# Patient Record
(demographics unavailable — no encounter records)

---

## 2024-10-25 NOTE — HISTORY OF PRESENT ILLNESS
[FreeTextEntry1] : October 24, 2024 Patient returns for a follow up visit of osteoporosis Currently with frozen shoulder of the left shoulder following with orthopedist Patient has lost weight since last visit due to diet changes to manage kidney stones  Currently doing shoulder exercises at home  Reports visiting the ER re: kidney stone since last visit Patient is tracking dietary calcium intake  Currently doing pilates, dancing for exercise  No dental work planned or jaw pain noted Patient had first infusion in 12/2023, no side effects noted with first infusion, due for next reclast dose on 12/2024  Repeat DEXA in 2025 before third infusion  Denies upcoming dental work  No falls, continues weight bearing exercises  November 3, 2023 Patient returned for follow up via telephonic visit.  Discussed with patient.  You have chosen to receive care through the use of tele-media.  Telemedia enables health care providers at different locations to provide safe, effective, and convenient care through the use of technology.  Please note this is a billable encounter.  Patient understands that I cannot perform a physical exam and that patient may need to come to the clinic to complete the assessment.  Patient agreed verbally to understanding the risks and benefits of telemedia as explained.  Reviewed lab results Reviewed DEXA results Reviewed treatment options, risks and benefits Will proceed with iv zoledronic acid  October 9, 2023 63-year-old woman referred by orthopedics for rheumatology evaluation. Patient noted to have osteoporosis, by recent bone density.  Patient had shoulder surgery by orthopedist, concerned about quality of the bone, recommended to have bone density. This was patient's first bone density, no previous bone densities in the past. Diagnosed with osteoporosis in September 2023. Patient reports very healthy Medications: Valtrex Bupropion Previously taking estrogen/progesterone for OCP for many years, transition to HRT since menopause at age 52, tried to stop HRT but was very symptomatic, last time tried to d/c 9 months ago   Treated with Advair inhaler in 2005, less than one year, maybe three months +nephrolithiasis, nonobstructive kidney stones noted incidentally on 2020 on CT scan Ribs fracture x 2 episodes, using chest as "work surface" May have had tailbone fracture s/p fall and once falling in patient  No history of celiac disease Did not drink milk when child Did not consume dairy as thought had dairy issues  Does not take any vitamin d, last level in 2021 No PPI in past, maybe twice per year takes tums  No previous history of disordered eating Up until March, pilates 3 times per week Walks now after shoulder surgery  Left thumb osteoarthritis No tobacco No etoh [place] shorter than previously  Drinks a lot of diet coke, no ETOH, no smoking No joint pain No back pain Np personal history of cancer Mother with Multiple myeloma and pancreatic cancer father with metastatic melanoma  Still in PT for the shoulder, for ROM, now strengthening, once per week at this time DOS:6/27/23, Right shoulder arthroscopy with RCR, subpectoralis biceps tenodesis  GM broken hip at 99 years of age Mother fell off ladder at 85 and broke her wrist No jaw pain, no dental work planned

## 2024-10-25 NOTE — ADDENDUM
[FreeTextEntry1] :  I, Susanna Robins, acted solely as a scribe for Dr. Jamia Plummer, direction and personally dictated by me on 10/24/2024. I have reviewed the chart and agree that the record accurately reflects my personal performance of the history, physical exam, assessment, and plan. I have also personally directed, reviewed, and agreed with the chart.

## 2024-10-25 NOTE — DATA REVIEWED
[FreeTextEntry1] : September 29, 2023 Bone density lumbar spine T score -4.0  left femur T score -2.7 Left femoral neck -2.2  Labs June 28, 2023  CMP normal  2021 Vitamin D 69.8 B12 452

## 2024-10-25 NOTE — PHYSICAL EXAM
[General Appearance - Alert] : alert [General Appearance - In No Acute Distress] : in no acute distress [Oriented To Time, Place, And Person] : oriented to person, place, and time [Impaired Insight] : insight and judgment were intact [Affect] : the affect was normal [Respiration, Rhythm And Depth] : normal respiratory rhythm and effort [Exaggerated Use Of Accessory Muscles For Inspiration] : no accessory muscle use [Edema] : there was no peripheral edema [No Spinal Tenderness] : no spinal tenderness [Abnormal Walk] : normal gait [Nail Clubbing] : no clubbing  or cyanosis of the fingernails [Musculoskeletal - Swelling] : no joint swelling seen [Motor Tone] : muscle strength and tone were normal [] : no rash [FreeTextEntry1] : no cervical kyphosis

## 2024-10-25 NOTE — ASSESSMENT
[FreeTextEntry1] : 64-year-old woman with osteoporosis, with a T score of -4.0 in the lumbar spine as well as T score of -2.7 in the left femur. Patient with risk factors for osteoporosis including postmenopausal status as well as low BMI. Patient with previous rib fracture as well as coccyx fracture status post fall. Previously discussed treatment options with patient including but not limited to anabolic agents such as Forteo or Tymlos, IV zoledronic acid, oral bisphosphonates, as well as denosumab 60 mg every 6 months.  Since last visit, patient reports visiting the ER re: kidney stones, with blood found in urine. Patient has adhered to diet changes re: managing kidney stones leading to her current weight loss. At this time, patient continues to adhere to at home shoulder exercises for left frozen shoulder. Patient s/p first dose of reclast infusion in 12/2023, due for next infusion dose on 12/2024, no side effects noted following first dose, reviewed ensuring proper hydration prior to infusion, along with acetaminophen before and after infusion as well.  Repeat DEXA in 2025 before third infusion. Discussed weightbearing exercises, increasing calcium enriched foods, calcium and vitamin D supplementation, and fall precautions discussed. Will update labs including Collagen Type I C-Telopeptide, CMP, Magnesium, PTH (given nephrolithiasis), Phosphorus, protein electrophoresis, TSH, Vit D.  Further management pending results.

## 2024-10-25 NOTE — REVIEW OF SYSTEMS
[Negative] : Heme/Lymph [Recent Weight Loss (___ Lbs)] : recent [unfilled] ~Ulb weight loss [FreeTextEntry9] : Left frozen shoulder

## 2024-12-18 NOTE — HISTORY OF PRESENT ILLNESS
[Denies] : Denies [No] : No [Yes] : Yes [Declined] : Declined [Informed consent documented in EHR.] : Informed consent documented in EHR. [Right upper extremity] : Right upper extremity [22g] : 22g [Start Time: ___] : Medication Start Time: [unfilled] [End Time: ___] : Medication End Time: [unfilled] [IV discontinued. Intact. No signs or symptoms of IV complications noted. Time: ___] : IV discontinued. Intact. No signs or symptoms of IV complications noted. Time: [unfilled] [Patient  instructed to seek medical attention with signs and symptoms of adverse effects] : Patient  instructed to seek medical attention with signs and symptoms of adverse effects [Patient left unit in no acute distress] : Patient left unit in no acute distress [Medications administered as ordered and tolerated well.] : Medications administered as ordered and tolerated well. [de-identified] : 9:41am

## 2025-02-18 NOTE — ASSESSMENT
[FreeTextEntry1] : Clinically it appears that she has primary hyperparathyroidism with osteoporosis and kidney stones. Elevated parathyroid hormone level with a normal but high range calcium level. Will recommend a parathyroid CT scan and sonogram together to localize a potential lesion.  She does have an incidental 1.7 cm T RADS 3 thyroid lesion.  We discussed observation versus ultrasound-guided biopsy.  She does not have a family history of thyroid cancer or significant radiation exposure.  She is opted to observe it.  the audiogram was ordered by me and interpreted by me today and the results are as follows- She has borderline sloping to abnormal pure-tone hearing with normal speech discrimination scores and tympanograms. I compared this to an audiogram from 2021 and it is essentially unchanged. Does not meet criteria for retrocochlear workup. Borderline candidate for amplification which she is medically cleared for. However she is not motivated for amplification at this time.

## 2025-02-18 NOTE — HISTORY OF PRESENT ILLNESS
[de-identified] :  HENRIK SANDOVAL is a 65 year old patient seen for hyperparathyroid and hearing loss. She was last seen in 2021 with bilateral high frequency sensorineural hearing loss. She comes in today with a thyroid ultrasound report from 11/2024 demonstrating a 1.7cm right mid pole thyroid nodule TIRADS-3. No family history of thyroid cancer. She has a history of osteoporosis and kidney stones and was found to have elevated parathyroid hormone. Her dentist felt nodules on her neck in 5/2024 and her PCP felt it at a later visit on 10/2024. She has a history of multiple I&D of thyroglassal duct cysts with Dr. Mcdonald and Cora. She finally had the cyst removed in 1997. She is a never smoker.

## 2025-03-24 NOTE — ASSESSMENT
[FreeTextEntry1] : Primary hyperparathyroidism. I reviewed elevated parathyroid hormone level with a normal calcium level. It is classic in this setting to have varying levels of parathyroid hormone and calcium levels that can even at times be normal. I explained to her that the only treatment option for primary hyperparathyroidism is surgery. With a history of osteoporosis and kidney stone she is an absolute candidate.  She also was found on recent imaging to have a 1.8 cm right sided thyroid lesion.  Previous ultrasound had demonstrated this to be a [place] 3 lesion and we discussed and she opted for observation. At this point as she is likely to move forward with parathyroid exploration I am going to recommend an ultrasound-guided biopsy of the right sided lesion.  We had a very detailed discussion regarding parathyroidectomy. I explained to her that in 80 to 90% of the time it is generally a single adenoma with a 10 to 20% of the time being 4 gland hyperplasia. We talked about the risks which included but were not limited the risks of general anesthesia, bleeding, infection, transient or permanent hoarseness. We also talked about the possibility of a negative exploration.

## 2025-03-24 NOTE — HISTORY OF PRESENT ILLNESS
[de-identified] : Comes in in follow-up to review her CT scan of her parathyroid glands and sonogram. The CT scan was very suggestive of a right sided parathyroid lesion without distinction of being upper or lower. The sonogram demonstrated an 8 mm right sided lesion and a 5 mm left hypoechoic lesion could represent parathyroid adenoma.  She has a clinical history of primary hyperparathyroidism with osteoporosis and history of kidney stones.  She also reminds me that she had multiple treatments including incision and drainages and resections of a thyroglossal duct cysts many years ago.    1 pair

## 2025-05-12 NOTE — HISTORY OF PRESENT ILLNESS
[de-identified] : Approximately 10 days status post parathyroid exploration. She is without complaints. Reports a normal voice. Did not really ever have pain.  Wound is well-healed. Voice is clear.  Pathology revealed a cellular parathyroid gland in the location deemed to be where the adenoma was preoperatively. 2 additional glands on the left look healthy at the time of surgery. Chemically she did not drop her parathyroid hormone level as I would have hoped. I made a decision to wake her up and potentially study her further.  At this point I will send her for a PTH level and calcium level. Pending those results there may be a role for a sestamibi scan for further evaluation and potentially a mediastinal lesion.

## 2025-07-22 NOTE — HISTORY OF PRESENT ILLNESS
[Medical Office: ([facility]– [place]___] : at the medical office located in  [Telephone (audio)] : This telephonic visit was provided via audio only technology. [de-identified] : Now approximately 2-1/2 months status post parathyroid exploration. She continues to have hypercalcemia. Postoperative sestamibi scan does not demonstrate a definitive lesion. There is a question of potentially of some avidity in the right lower lobe of the thyroid gland.  She is known to have a thyroid nodule in this area.  Cytology was benign.  No PTH testing was done on the nodule.  I explained to her options moving forward are continued observation versus re-exploration with resection of the right lower lobe cyst and possible hemithyroidectomy. She endorsed understanding these recommendations. She will decide what she would like to do moving forward.  I have also suggested she have a follow-up with an endocrinologist, and I provided her name for evaluation of other causes of hypercalcemia such as hypercalciuria, underlying kidney pathology, Vit D deficiency, or secondary hyperparathyroidism.

## 2025-07-22 NOTE — HISTORY OF PRESENT ILLNESS
[Medical Office: ([facility])___] : at the medical office located in  [Telephone (audio)] : This telephonic visit was provided via audio only technology. [de-identified] : Now approximately 2-1/2 months status post parathyroid exploration. She continues to have hypercalcemia. Postoperative sestamibi scan does not demonstrate a definitive lesion. There is a question of potentially of some avidity in the right lower lobe of the thyroid gland.  She is known to have a thyroid nodule in this area.  Cytology was benign.  No PTH testing was done on the nodule.  I explained to her options moving forward are continued observation versus re-exploration with resection of the right lower lobe cyst and possible hemithyroidectomy. She endorsed understanding these recommendations. She will decide what she would like to do moving forward.  I have also suggested she have a follow-up with an endocrinologist, and I provided her name for evaluation of other causes of hypercalcemia such as hypercalciuria, underlying kidney pathology, Vit D deficiency, or secondary hyperparathyroidism.

## 2025-07-22 NOTE — HISTORY OF PRESENT ILLNESS
[Medical Office: ([facility])___] : at the medical office located in  [Telephone (audio)] : This telephonic visit was provided via audio only technology. [de-identified] : Now approximately 2-1/2 months status post parathyroid exploration. She continues to have hypercalcemia. Postoperative sestamibi scan does not demonstrate a definitive lesion. There is a question of potentially of some avidity in the right lower lobe of the thyroid gland.  She is known to have a thyroid nodule in this area.  Cytology was benign.  No PTH testing was done on the nodule.  I explained to her options moving forward are continued observation versus re-exploration with resection of the right lower lobe cyst and possible hemithyroidectomy. She endorsed understanding these recommendations. She will decide what she would like to do moving forward.  I have also suggested she have a follow-up with an endocrinologist, and I provided her name for evaluation of other causes of hypercalcemia such as hypercalciuria, underlying kidney pathology, Vit D deficiency, or secondary hyperparathyroidism.